# Patient Record
Sex: MALE | Race: WHITE | NOT HISPANIC OR LATINO | Employment: FULL TIME | URBAN - NONMETROPOLITAN AREA
[De-identification: names, ages, dates, MRNs, and addresses within clinical notes are randomized per-mention and may not be internally consistent; named-entity substitution may affect disease eponyms.]

---

## 2017-07-11 ENCOUNTER — OFFICE VISIT (OUTPATIENT)
Dept: CARDIOLOGY | Facility: CLINIC | Age: 22
End: 2017-07-11

## 2017-07-11 VITALS
SYSTOLIC BLOOD PRESSURE: 135 MMHG | WEIGHT: 150.2 LBS | BODY MASS INDEX: 20.34 KG/M2 | DIASTOLIC BLOOD PRESSURE: 73 MMHG | HEART RATE: 81 BPM | HEIGHT: 72 IN | OXYGEN SATURATION: 99 %

## 2017-07-11 DIAGNOSIS — R06.02 SHORTNESS OF BREATH: ICD-10-CM

## 2017-07-11 DIAGNOSIS — R07.9 CHEST PAIN, UNSPECIFIED TYPE: Primary | ICD-10-CM

## 2017-07-11 PROCEDURE — 99204 OFFICE O/P NEW MOD 45 MIN: CPT | Performed by: PHYSICIAN ASSISTANT

## 2017-07-11 PROCEDURE — 93000 ELECTROCARDIOGRAM COMPLETE: CPT | Performed by: PHYSICIAN ASSISTANT

## 2017-07-11 NOTE — PROGRESS NOTES
"Shireen Justin is a 21 y.o. male     Chief Complaint   Patient presents with   • Chest Pain     presents as a new pateint        HPI  The patient presents today for initial evaluation the setting of chest pain and an abnormality noted on EKG.  The patient presented to his primary care provider because of chest pain and his concern because of family history.  An EKG was performed which indicated sinus rhythm, report of \"right atrial overload\", but no acute changes noted.  He was then referred here.  We did get a repeat EKG which does indicate sinus rhythm, p pulmonale questioning right atrial enlargement (although doubtful based on review the EKG), and no acute changes noted otherwise.  The patient does note chest pain.  He describes a precordial sharp sticking sensation.  There is no significant referral of that discomfort.  He also describes dyspnea with that.  This can occur with exertion or with rest.  He has no hemoptysis, cough, wheeze, etc. associated with that.  He has had no failure or dysrhythmic symptoms.  Exercise capacity has maintained normalcy per patient report.  The patient denies further complaints otherwise and feels that he is doing well outside of the above.  Again with his family history, he would like to pursue further evaluation.      No current outpatient prescriptions on file.     No current facility-administered medications for this visit.        Review of patient's allergies indicates no known allergies.    History reviewed. No pertinent past medical history.    Social History     Social History   • Marital status:      Spouse name: N/A   • Number of children: N/A   • Years of education: N/A     Occupational History   • Not on file.     Social History Main Topics   • Smoking status: Current Every Day Smoker     Packs/day: 0.25     Types: Cigarettes   • Smokeless tobacco: Never Used      Comment: 1-1.5 pack weekly    • Alcohol use No   • Drug use: No   • Sexual activity: " "Defer     Other Topics Concern   • Not on file     Social History Narrative   • No narrative on file       Family History   Problem Relation Age of Onset   • No Known Problems Mother    • No Known Problems Father        Review of Systems   Constitutional: Negative.    HENT: Negative.    Eyes: Negative.    Respiratory: Negative.    Cardiovascular: Positive for chest pain (midsternal ). Negative for palpitations and leg swelling.   Gastrointestinal: Negative.    Endocrine: Negative.    Genitourinary: Negative.    Musculoskeletal: Negative.    Skin: Negative.    Allergic/Immunologic: Negative.    Neurological: Negative.    Hematological: Negative.    Psychiatric/Behavioral: Negative.        Objective     /73 (BP Location: Left arm, Patient Position: Sitting)  Pulse 81  Ht 72\" (182.9 cm)  Wt 150 lb 3.2 oz (68.1 kg)  SpO2 99%  BMI 20.37 kg/m2    Lab Results (most recent)     None          Physical Exam   Constitutional: He is oriented to person, place, and time. He appears well-developed and well-nourished. No distress.   HENT:   Head: Normocephalic and atraumatic.   Eyes: Conjunctivae and EOM are normal. Pupils are equal, round, and reactive to light.   Neck: Normal range of motion. Neck supple. No JVD present. No tracheal deviation present.   Cardiovascular: Normal rate, regular rhythm, normal heart sounds and intact distal pulses.    Pulmonary/Chest: Effort normal and breath sounds normal.   Abdominal: Soft. Bowel sounds are normal. He exhibits no distension and no mass. There is no tenderness. There is no rebound and no guarding.   Musculoskeletal: Normal range of motion. He exhibits no edema, tenderness or deformity.   Neurological: He is alert and oriented to person, place, and time.   Skin: Skin is warm and dry. No rash noted. No erythema. No pallor.   Psychiatric: He has a normal mood and affect. His behavior is normal. Judgment and thought content normal.   Nursing note and vitals " reviewed.      Procedure     ECG 12 Lead  Date/Time: 7/11/2017 3:58 PM  Performed by: BEN DESAI  Authorized by: BEN DESAI   Comments: Chest pain  As outlined above.                 Assessment/Plan      Diagnosis Plan   1. Chest pain, unspecified type  ECG 12 Lead   2. Shortness of breath         The patient will be scheduled for regular treadmill stress test and an echo given symptoms as above.  I will make no changes in medications.  I would like to see him back after testing and recommend further to him at that time.  For any issues prior to follow-up, he will call to us.

## 2017-08-08 ENCOUNTER — APPOINTMENT (OUTPATIENT)
Dept: CARDIOLOGY | Facility: HOSPITAL | Age: 22
End: 2017-08-08